# Patient Record
Sex: FEMALE | URBAN - METROPOLITAN AREA
[De-identification: names, ages, dates, MRNs, and addresses within clinical notes are randomized per-mention and may not be internally consistent; named-entity substitution may affect disease eponyms.]

---

## 2023-10-03 ENCOUNTER — APPOINTMENT (OUTPATIENT)
Dept: URBAN - METROPOLITAN AREA CLINIC 300 | Age: 23
Setting detail: DERMATOLOGY
End: 2023-10-04

## 2023-10-03 VITALS — WEIGHT: 178.8 LBS | HEIGHT: 67 IN

## 2023-10-03 DIAGNOSIS — L70.0 ACNE VULGARIS: ICD-10-CM

## 2023-10-03 DIAGNOSIS — Z79.899 OTHER LONG TERM (CURRENT) DRUG THERAPY: ICD-10-CM

## 2023-10-03 PROCEDURE — OTHER HIGH RISK MEDICATION MONITORING: OTHER

## 2023-10-03 PROCEDURE — OTHER ADDITIONAL NOTES: OTHER

## 2023-10-03 PROCEDURE — OTHER MIPS QUALITY: OTHER

## 2023-10-03 PROCEDURE — OTHER ISOTRETINOIN INITIATION: OTHER

## 2023-10-03 PROCEDURE — 99204 OFFICE O/P NEW MOD 45 MIN: CPT

## 2023-10-03 PROCEDURE — OTHER URINE PREGNANCY TEST: OTHER

## 2023-10-03 PROCEDURE — OTHER ORDER TESTS: OTHER

## 2023-10-03 PROCEDURE — OTHER COUNSELING: OTHER

## 2023-10-03 PROCEDURE — 81025 URINE PREGNANCY TEST: CPT

## 2023-10-03 ASSESSMENT — LOCATION DETAILED DESCRIPTION DERM
LOCATION DETAILED: RIGHT MEDIAL FOREHEAD
LOCATION DETAILED: LEFT CHIN
LOCATION DETAILED: RIGHT INFERIOR CENTRAL MALAR CHEEK
LOCATION DETAILED: LEFT INFERIOR CENTRAL MALAR CHEEK
LOCATION DETAILED: UPPER STERNUM
LOCATION DETAILED: RIGHT SUPERIOR MEDIAL UPPER BACK

## 2023-10-03 ASSESSMENT — LOCATION SIMPLE DESCRIPTION DERM
LOCATION SIMPLE: LEFT CHEEK
LOCATION SIMPLE: RIGHT UPPER BACK
LOCATION SIMPLE: RIGHT CHEEK
LOCATION SIMPLE: CHIN
LOCATION SIMPLE: CHEST
LOCATION SIMPLE: RIGHT FOREHEAD

## 2023-10-03 ASSESSMENT — LOCATION ZONE DERM
LOCATION ZONE: FACE
LOCATION ZONE: TRUNK

## 2023-10-03 NOTE — PROCEDURE: HIGH RISK MEDICATION MONITORING
S/w pt and he will try any of the above mentioned medications  Pt requests script sent to [facility]  Pt advised he will be contacted with instructions  Prednisone Pregnancy And Lactation Text: This medication is Pregnancy Category C and it isn't know if it is safe during pregnancy. This medication is excreted in breast milk.

## 2023-10-03 NOTE — PROCEDURE: ADDITIONAL NOTES
Render Risk Assessment In Note?: no
Additional Notes: Patient to be started on medication at November visit.
Detail Level: Simple

## 2023-10-03 NOTE — PROCEDURE: HIGH RISK MEDICATION MONITORING
Xelgeorgiez Pregnancy And Lactation Text: This medication is Pregnancy Category D and is not considered safe during pregnancy.  The risk during breast feeding is also uncertain.

## 2023-10-03 NOTE — PROCEDURE: HIGH RISK MEDICATION MONITORING
mychart msg sent   High Dose Vitamin A Pregnancy And Lactation Text: High dose vitamin A therapy is contraindicated during pregnancy and breast feeding.

## 2023-10-03 NOTE — PROCEDURE: ORDER TESTS
Performing Laboratory: 0
Billing Type: Third-Party Bill
Expected Date Of Service: 10/03/2023
Bill For Surgical Tray: no
Clinical Notes (To The Lab): Standing order

## 2023-10-03 NOTE — HPI: PIMPLES (ACNE)
How Severe Is Your Acne?: moderate
Is This A New Presentation, Or A Follow-Up?: Acne
Additional Comments (Use Complete Sentences): Patient also states she has had chemical peels also
Females Only: When Was Your Last Menstrual Period?: 09/15/2023

## 2023-10-03 NOTE — PROCEDURE: COUNSELING
Azithromycin Counseling:  I discussed with the patient the risks of azithromycin including but not limited to GI upset, allergic reaction, drug rash, diarrhea, and yeast infections.
Dapsone Pregnancy And Lactation Text: This medication is Pregnancy Category C and is not considered safe during pregnancy or breast feeding.
High Dose Vitamin A Counseling: Side effects reviewed, pt to contact office should one occur.
Tazorac Pregnancy And Lactation Text: This medication is not safe during pregnancy. It is unknown if this medication is excreted in breast milk.
Detail Level: Detailed
Isotretinoin Counseling: Patient should get monthly blood tests, not donate blood, not drive at night if vision affected, not share medication, and not undergo elective surgery for 6 months after tx completed. Side effects reviewed, pt to contact office should one occur.
Birth Control Pills Pregnancy And Lactation Text: This medication should be avoided if pregnant and for the first 30 days post-partum.
Sarecycline Pregnancy And Lactation Text: This medication is Pregnancy Category D and not consider safe during pregnancy. It is also excreted in breast milk.
Azelaic Acid Counseling: Patient counseled that medicine may cause skin irritation and to avoid applying near the eyes.  In the event of skin irritation, the patient was advised to reduce the amount of the drug applied or use it less frequently.   The patient verbalized understanding of the proper use and possible adverse effects of azelaic acid.  All of the patient's questions and concerns were addressed.
Winlevi Counseling:  I discussed with the patient the risks of topical clascoterone including but not limited to erythema, scaling, itching, and stinging. Patient voiced their understanding.
Topical Retinoid Pregnancy And Lactation Text: This medication is Pregnancy Category C. It is unknown if this medication is excreted in breast milk.
Bactrim Pregnancy And Lactation Text: This medication is Pregnancy Category D and is known to cause fetal risk.  It is also excreted in breast milk.
Erythromycin Counseling:  I discussed with the patient the risks of erythromycin including but not limited to GI upset, allergic reaction, drug rash, diarrhea, increase in liver enzymes, and yeast infections.
Aklief counseling:  Patient advised to apply a pea-sized amount only at bedtime and wait 30 minutes after washing their face before applying.  If too drying, patient may add a non-comedogenic moisturizer.  The most commonly reported side effects including irritation, redness, scaling, dryness, stinging, burning, itching, and increased risk of sunburn.  The patient verbalized understanding of the proper use and possible adverse effects of retinoids.  All of the patient's questions and concerns were addressed.
Benzoyl Peroxide Pregnancy And Lactation Text: This medication is Pregnancy Category C. It is unknown if benzoyl peroxide is excreted in breast milk.
Topical Sulfur Applications Counseling: Topical Sulfur Counseling: Patient counseled that this medication may cause skin irritation or allergic reactions.  In the event of skin irritation, the patient was advised to reduce the amount of the drug applied or use it less frequently.   The patient verbalized understanding of the proper use and possible adverse effects of topical sulfur application.  All of the patient's questions and concerns were addressed.
Azithromycin Pregnancy And Lactation Text: This medication is considered safe during pregnancy and is also secreted in breast milk.
Doxycycline Counseling:  Patient counseled regarding possible photosensitivity and increased risk for sunburn.  Patient instructed to avoid sunlight, if possible.  When exposed to sunlight, patients should wear protective clothing, sunglasses, and sunscreen.  The patient was instructed to call the office immediately if the following severe adverse effects occur:  hearing changes, easy bruising/bleeding, severe headache, or vision changes.  The patient verbalized understanding of the proper use and possible adverse effects of doxycycline.  All of the patient's questions and concerns were addressed.
High Dose Vitamin A Pregnancy And Lactation Text: High dose vitamin A therapy is contraindicated during pregnancy and breast feeding.
Tetracycline Counseling: Patient counseled regarding possible photosensitivity and increased risk for sunburn.  Patient instructed to avoid sunlight, if possible.  When exposed to sunlight, patients should wear protective clothing, sunglasses, and sunscreen.  The patient was instructed to call the office immediately if the following severe adverse effects occur:  hearing changes, easy bruising/bleeding, severe headache, or vision changes.  The patient verbalized understanding of the proper use and possible adverse effects of tetracycline.  All of the patient's questions and concerns were addressed. Patient understands to avoid pregnancy while on therapy due to potential birth defects.
Spironolactone Counseling: Patient advised regarding risks of diarrhea, abdominal pain, hyperkalemia, birth defects (for female patients), liver toxicity and renal toxicity. The patient may need blood work to monitor liver and kidney function and potassium levels while on therapy. The patient verbalized understanding of the proper use and possible adverse effects of spironolactone.  All of the patient's questions and concerns were addressed.
Azelaic Acid Pregnancy And Lactation Text: This medication is considered safe during pregnancy and breast feeding.
Include Pregnancy/Lactation Warning?: No
Topical Clindamycin Counseling: Patient counseled that this medication may cause skin irritation or allergic reactions.  In the event of skin irritation, the patient was advised to reduce the amount of the drug applied or use it less frequently.   The patient verbalized understanding of the proper use and possible adverse effects of clindamycin.  All of the patient's questions and concerns were addressed.
Isotretinoin Pregnancy And Lactation Text: This medication is Pregnancy Category X and is considered extremely dangerous during pregnancy. It is unknown if it is excreted in breast milk.
Dapsone Counseling: I discussed with the patient the risks of dapsone including but not limited to hemolytic anemia, agranulocytosis, rashes, methemoglobinemia, kidney failure, peripheral neuropathy, headaches, GI upset, and liver toxicity.  Patients who start dapsone require monitoring including baseline LFTs and weekly CBCs for the first month, then every month thereafter.  The patient verbalized understanding of the proper use and possible adverse effects of dapsone.  All of the patient's questions and concerns were addressed.
Birth Control Pills Counseling: Birth Control Pill Counseling: I discussed with the patient the potential side effects of OCPs including but not limited to increased risk of stroke, heart attack, thrombophlebitis, deep venous thrombosis, hepatic adenomas, breast changes, GI upset, headaches, and depression.  The patient verbalized understanding of the proper use and possible adverse effects of OCPs. All of the patient's questions and concerns were addressed.
Erythromycin Pregnancy And Lactation Text: This medication is Pregnancy Category B and is considered safe during pregnancy. It is also excreted in breast milk.
Sarecycline Counseling: Patient advised regarding possible photosensitivity and discoloration of the teeth, skin, lips, tongue and gums.  Patient instructed to avoid sunlight, if possible.  When exposed to sunlight, patients should wear protective clothing, sunglasses, and sunscreen.  The patient was instructed to call the office immediately if the following severe adverse effects occur:  hearing changes, easy bruising/bleeding, severe headache, or vision changes.  The patient verbalized understanding of the proper use and possible adverse effects of sarecycline.  All of the patient's questions and concerns were addressed.
Aklief Pregnancy And Lactation Text: It is unknown if this medication is safe to use during pregnancy.  It is unknown if this medication is excreted in breast milk.  Breastfeeding women should use the topical cream on the smallest area of the skin for the shortest time needed while breastfeeding.  Do not apply to nipple and areola.
Tazorac Counseling:  Patient advised that medication is irritating and drying.  Patient may need to apply sparingly and wash off after an hour before eventually leaving it on overnight.  The patient verbalized understanding of the proper use and possible adverse effects of tazorac.  All of the patient's questions and concerns were addressed.
Winlevi Pregnancy And Lactation Text: This medication is considered safe during pregnancy and breastfeeding.
Topical Retinoid counseling:  Patient advised to apply a pea-sized amount only at bedtime and wait 30 minutes after washing their face before applying.  If too drying, patient may add a non-comedogenic moisturizer. The patient verbalized understanding of the proper use and possible adverse effects of retinoids.  All of the patient's questions and concerns were addressed.
Topical Sulfur Applications Pregnancy And Lactation Text: This medication is Pregnancy Category C and has an unknown safety profile during pregnancy. It is unknown if this topical medication is excreted in breast milk.
Bactrim Counseling:  I discussed with the patient the risks of sulfa antibiotics including but not limited to GI upset, allergic reaction, drug rash, diarrhea, dizziness, photosensitivity, and yeast infections.  Rarely, more serious reactions can occur including but not limited to aplastic anemia, agranulocytosis, methemoglobinemia, blood dyscrasias, liver or kidney failure, lung infiltrates or desquamative/blistering drug rashes.
Doxycycline Pregnancy And Lactation Text: This medication is Pregnancy Category D and not consider safe during pregnancy. It is also excreted in breast milk but is considered safe for shorter treatment courses.
Minocycline Counseling: Patient advised regarding possible photosensitivity and discoloration of the teeth, skin, lips, tongue and gums.  Patient instructed to avoid sunlight, if possible.  When exposed to sunlight, patients should wear protective clothing, sunglasses, and sunscreen.  The patient was instructed to call the office immediately if the following severe adverse effects occur:  hearing changes, easy bruising/bleeding, severe headache, or vision changes.  The patient verbalized understanding of the proper use and possible adverse effects of minocycline.  All of the patient's questions and concerns were addressed.
Topical Clindamycin Pregnancy And Lactation Text: This medication is Pregnancy Category B and is considered safe during pregnancy. It is unknown if it is excreted in breast milk.
Spironolactone Pregnancy And Lactation Text: This medication can cause feminization of the male fetus and should be avoided during pregnancy. The active metabolite is also found in breast milk.
Benzoyl Peroxide Counseling: Patient counseled that medicine may cause skin irritation and bleach clothing.  In the event of skin irritation, the patient was advised to reduce the amount of the drug applied or use it less frequently.   The patient verbalized understanding of the proper use and possible adverse effects of benzoyl peroxide.  All of the patient's questions and concerns were addressed.

## 2024-04-17 NOTE — PROCEDURE: HIGH RISK MEDICATION MONITORING
0 = understands/communicates without difficulty Eucrisa Pregnancy And Lactation Text: This medication has not been assigned a Pregnancy Risk Category but animal studies failed to show danger with the topical medication. It is unknown if the medication is excreted in breast milk.

## 2025-04-29 NOTE — PROCEDURE: HIGH RISK MEDICATION MONITORING
Chief Complaint  F/u obesity     SUBJECTIVE  Angelique Cordon presents to [facility]   Patient presents today via telehealth to discuss Wegovy.  Patient would also like to discuss her upcoming surgery.  Patient reports that she was informed that in patients with lipedema it is common to have to have touchup surgeries eventually on down the line, she would like to discuss her options to try to help avoiding this.      Pt states that her weight has been fairly steady recently.  Pt is coming In later today for an off weight check.       History of Present Illness  Past Medical History:   Diagnosis Date    Abnormal bone density screening     Anxiety     History of transfusion     X2 following previous leg surgeries    Lipedema     Lipedema of lower extremity     LEGS AND ARMS    Localized edema     Pap smear for cervical cancer screening 2018    PONV (postoperative nausea and vomiting)     Skin irritation     noted under nasal area, currently using  ointment prescribed by pcp    Varicose vein of leg     Visit for screening mammogram       Family History   Problem Relation Age of Onset    Diabetes Mother     Sleep apnea Mother     COPD Mother     Hypertension Mother     Colon cancer Father     Malig Hyperthermia Neg Hx       Past Surgical History:   Procedure Laterality Date    EXCISION LESION Left 4/1/2022    Procedure: EXCISION CYST LEFT AXILLARY;  Surgeon: Rosalie Talley MD;  [place] [facility] [place] [facility] [place]  Service: General;  Laterality: Left;    FOOT SURGERY Left 2003/2009 HAD 3 DIFFERENT SURGERIES    L FOOT/TENDON RELEASE PLANTAR FASIA    LIPOSUCTION Right 10/24/2023    Procedure: LIPOSUCTION, Right Lower Extremity Circumferential Liposuction with Skin Resection and negative pressure dressing;  Surgeon: Raffaele Willams MD;  [place] [facility] [place] [facility] [place]  Service: Plastics;  Laterality: Right;    LIPOSUCTION Left 4/19/2024    Procedure: LIPOSUCTION, Left Lower Extremity  "Circumferential Liposuction with Skin Resection and negative pressure dressing;  Surgeon: Raffaele Willams MD;  Location[place] [facility] [place] [facility] [place]  Service: Plastics;  Laterality: Left;    LIPOSUCTION Bilateral 10/16/2024    Procedure: LIPOSUCTION BACK, hip liposuction with skin excision and closure;  Surgeon: Raffaele Willams MD;  Location: [facility] OR;  Service: Plastics;  Laterality: Bilateral;        Current Outpatient Medications:     ibuprofen (ADVIL,MOTRIN) 800 MG tablet, Take 1 tablet by mouth Every 8 (Eight) Hours As Needed for Mild Pain., Disp: 30 tablet, Rfl: 0    melatonin 5 MG tablet tablet, Take 1 tablet by mouth Every Night., Disp: , Rfl:     miconazole (Micatin) 2 % cream, Apply 1 Application topically to the appropriate area as directed 2 (Two) Times a Day., Disp: 35 g, Rfl: 0    Scopolamine 1 MG/3DAYS patch, Place 1 patch on the skin as directed by provider Every 72 (Seventy-Two) Hours., Disp: 4 each, Rfl: 0    Semaglutide-Weight Management (Wegovy) 1.7 MG/0.75ML solution auto-injector, Inject 0.75 mL under the skin into the appropriate area as directed 1 (One) Time Per Week., Disp: 3 mL, Rfl: 2    vitamin D (ERGOCALCIFEROL) 1.25 MG (46811 UT) capsule capsule, Take 1 capsule by mouth 1 (One) Time Per Week., Disp: 13 capsule, Rfl: 1    Vortioxetine HBr (Trintellix) 20 MG tablet, Take 1 tablet by mouth Daily., Disp: 90 tablet, Rfl: 1    OBJECTIVE  Vital Signs:   Ht 152.4 cm (60\")   Wt 61.2 kg (135 lb)   BMI 26.37 kg/m²    Estimated body mass index is 26.37 kg/m² as calculated from the following:    Height as of this encounter: 152.4 cm (60\").    Weight as of this encounter: 61.2 kg (135 lb).     Wt Readings from Last 3 Encounters:   04/29/25 61.2 kg (135 lb)   02/06/25 57.2 kg (126 lb)   11/26/24 64.4 kg (142 lb)     BP Readings from Last 3 Encounters:   11/22/24 131/85   11/11/24 120/70   11/07/24 110/75       Physical Exam  Constitutional:       Appearance: Normal appearance.   HENT:      " Head: Normocephalic and atraumatic.   Pulmonary:      Effort: Pulmonary effort is normal.   Neurological:      Mental Status: She is alert and oriented to person, place, and time.   Psychiatric:         Mood and Affect: Mood normal.         Behavior: Behavior normal.         Thought Content: Thought content normal.         Judgment: Judgment normal.          Result Review    CMP          7/9/2024    07:58 10/17/2024    04:37   CMP   Glucose 87  139    BUN 16  9    Creatinine 0.72  0.69    EGFR 105.9  109.9    Sodium 139  138    Potassium 4.2  4.4    Chloride 103  108    Calcium 9.6  8.1    Total Protein 7.2     Albumin 4.4     Globulin 2.8     Total Bilirubin 0.3     Alkaline Phosphatase 42     AST (SGOT) 15     ALT (SGPT) 18     Albumin/Globulin Ratio 1.6     BUN/Creatinine Ratio 22.2  13.0    Anion Gap 11.0  5.5      CBC          7/9/2024    07:58 10/17/2024    04:37 11/18/2024    15:49   CBC   WBC 4.40  9.36  5.81    RBC 4.76  2.59  3.66    Hemoglobin 14.3  8.1  11.4    Hematocrit 44.0  24.8  34.7    MCV 92.4  95.8  94.8    MCH 30.0  31.3  31.1    MCHC 32.5  32.7  32.9    RDW 12.7  13.5  13.0    Platelets 191  202  320      TSH          7/9/2024    07:58   TSH   TSH 3.440        No Images in the past 120 days found..     The above data has been reviewed by ANDREI Olivera 04/29/2025 08:42 EDT.          Patient Care Team:  Hannah Parrish APRN as PCP - General (Nurse Practitioner)  Rosalie Talley MD as Consulting Physician (General Surgery)  Raffaele Willams MD as Consulting Physician (Plastic Surgery)    BMI is within normal parameters. No other follow-up for BMI required.       ASSESSMENT & PLAN    Diagnoses and all orders for this visit:    1. Overweight (BMI 25.0-29.9) (Primary)  Assessment & Plan:  In office weight 135 pounds, patient will continue Wegovy, discussed recommend continuing maintenance dose once BMI is at goal, we will follow-up again in 3 months      2.  Lipedema  Assessment & Plan:  We discussed her office shin to cont Wegovy postsurgery, patient tentatively scheduled for surgery in July, knows she has to stop the medication prior to surgery, we will follow-up afterwards      3. Colon cancer screening  -     Ambulatory Referral For Screening Colonoscopy    4. Family history of colon cancer  -     Ambulatory Referral For Screening Colonoscopy         Tobacco Use: Low Risk  (4/29/2025)    Patient History     Smoking Tobacco Use: Never     Smokeless Tobacco Use: Never     Passive Exposure: Never       Follow Up     Return in about 3 months (around 7/29/2025), or if symptoms worsen or fail to improve.        Patient was given instructions and counseling regarding her condition or for health maintenance advice. Please see specific information pulled into the AVS if appropriate.   I have reviewed information obtained and documented by others and I have confirmed the accuracy of this documented note.    Hannah Parrish, APRN         Prednisone Counseling:  I discussed with the patient the risks of prolonged use of prednisone including but not limited to weight gain, insomnia, osteoporosis, mood changes, diabetes, susceptibility to infection, glaucoma and high blood pressure.  In cases where prednisone use is prolonged, patients should be monitored with blood pressure checks, serum glucose levels and an eye exam.  Additionally, the patient may need to be placed on GI prophylaxis, PCP prophylaxis, and calcium and vitamin D supplementation and/or a bisphosphonate.  The patient verbalized understanding of the proper use and the possible adverse effects of prednisone.  All of the patient's questions and concerns were addressed.